# Patient Record
Sex: MALE | Race: WHITE | NOT HISPANIC OR LATINO | Employment: FULL TIME | URBAN - METROPOLITAN AREA
[De-identification: names, ages, dates, MRNs, and addresses within clinical notes are randomized per-mention and may not be internally consistent; named-entity substitution may affect disease eponyms.]

---

## 2018-07-05 DIAGNOSIS — I25.10 CORONARY ARTERY DISEASE, ANGINA PRESENCE UNSPECIFIED, UNSPECIFIED VESSEL OR LESION TYPE, UNSPECIFIED WHETHER NATIVE OR TRANSPLANTED HEART: Primary | ICD-10-CM

## 2018-07-06 ENCOUNTER — CLINICAL SUPPORT (OUTPATIENT)
Dept: CARDIOLOGY | Facility: CLINIC | Age: 63
End: 2018-07-06
Payer: COMMERCIAL

## 2018-07-06 ENCOUNTER — OFFICE VISIT (OUTPATIENT)
Dept: CARDIOLOGY | Facility: CLINIC | Age: 63
End: 2018-07-06
Payer: COMMERCIAL

## 2018-07-06 VITALS
WEIGHT: 208 LBS | BODY MASS INDEX: 26.69 KG/M2 | HEART RATE: 60 BPM | HEIGHT: 74 IN | DIASTOLIC BLOOD PRESSURE: 92 MMHG | SYSTOLIC BLOOD PRESSURE: 156 MMHG

## 2018-07-06 DIAGNOSIS — I10 ESSENTIAL HYPERTENSION: Primary | ICD-10-CM

## 2018-07-06 DIAGNOSIS — B20 HIV (HUMAN IMMUNODEFICIENCY VIRUS INFECTION): ICD-10-CM

## 2018-07-06 DIAGNOSIS — I25.10 CORONARY ARTERY DISEASE, ANGINA PRESENCE UNSPECIFIED, UNSPECIFIED VESSEL OR LESION TYPE, UNSPECIFIED WHETHER NATIVE OR TRANSPLANTED HEART: ICD-10-CM

## 2018-07-06 DIAGNOSIS — R07.9 CHEST PAIN SYNDROME: ICD-10-CM

## 2018-07-06 DIAGNOSIS — M79.606 PAIN OF LOWER EXTREMITY, UNSPECIFIED LATERALITY: ICD-10-CM

## 2018-07-06 PROCEDURE — 93000 ELECTROCARDIOGRAM COMPLETE: CPT | Mod: S$GLB,,, | Performed by: INTERNAL MEDICINE

## 2018-07-06 PROCEDURE — 3008F BODY MASS INDEX DOCD: CPT | Mod: CPTII,S$GLB,, | Performed by: INTERNAL MEDICINE

## 2018-07-06 PROCEDURE — 99204 OFFICE O/P NEW MOD 45 MIN: CPT | Mod: S$GLB,,, | Performed by: INTERNAL MEDICINE

## 2018-07-06 PROCEDURE — 99999 PR PBB SHADOW E&M-EST. PATIENT-LVL III: CPT | Mod: PBBFAC,,, | Performed by: INTERNAL MEDICINE

## 2018-07-06 RX ORDER — LOPERAMIDE HCL 2 MG
2 TABLET ORAL 2 TIMES DAILY
COMMUNITY

## 2018-07-06 RX ORDER — RITONAVIR 100 MG/1
100 TABLET ORAL DAILY
COMMUNITY
Start: 2015-12-30

## 2018-07-06 RX ORDER — LOSARTAN POTASSIUM 25 MG/1
25 TABLET ORAL DAILY
Qty: 30 TABLET | Refills: 3 | Status: SHIPPED | OUTPATIENT
Start: 2018-07-06 | End: 2018-08-24 | Stop reason: SDUPTHER

## 2018-07-06 RX ORDER — ASPIRIN 81 MG/1
81 TABLET ORAL
COMMUNITY

## 2018-07-06 RX ORDER — DOXYCYCLINE HYCLATE 20 MG
20 TABLET ORAL 2 TIMES DAILY
COMMUNITY
Start: 2015-12-24

## 2018-07-06 RX ORDER — DARUNAVIR 800 MG/1
800 TABLET, FILM COATED ORAL
COMMUNITY
Start: 2015-12-31

## 2018-07-06 NOTE — PROGRESS NOTES
Subjective:   Patient ID:  Edward Brown is a 63 y.o. male who presents for evaluation of Peripheral Vascular Disease and Chest Pain      HPI  A 64 yo male hiv positive is here for evaluation of htn. He plays [employer] twice weekly does grocery shopping work in the yard no chest pain or shortness of breath. He has sleep apnea on therapy has chest pain that occurs w/o any rhyme or reason no associated symptoms has no  shortness of breath unless he exerts himself  No morning cough no dizziness lightheadedness syncope near syncope no palpitation. He does not use extra salt in food. Has no regular exercise otherwise. Has leg pain at nite  From knees down. No claudicatin.  Past Medical History:   Diagnosis Date    Essential hypertension 7/6/2018    HIV (human immunodeficiency virus infection)     Human immunodeficiency virus (HIV)        History reviewed. No pertinent surgical history.    Social History   Substance Use Topics    Smoking status: Current Every Day Smoker     Years: 40.00    Smokeless tobacco: Never Used    Alcohol use 1.8 oz/week     3 Shots of liquor per week      Comment: 1.8oz x7        Family History   Problem Relation Age of Onset    Pacemaker/defibrilator Mother     Pacemaker/defibrilator Father     Stroke Neg Hx     Heart attack Neg Hx        Current Outpatient Prescriptions   Medication Sig    ascorbic acid, vitamin C, (VITAMIN C) 100 MG tablet Take 1,000 mg by mouth once daily.    aspirin (ECOTRIN) 81 MG EC tablet Take 81 mg by mouth.    darunavir ethanolate (PREZISTA) 800 mg Tab Take by mouth.    dolutegravir (TIVICAY) 50 mg Tab Take by mouth.    doxycycline (PERIOSTAT) 20 MG tablet Take 20 mg by mouth.    loperamide (IMODIUM A-D) 2 mg Tab Take 2 mg by mouth.    ritonavir (NORVIR) 100 mg Tab tablet Take by mouth.     No current facility-administered medications for this visit.      No current outpatient prescriptions on file prior to visit.     No current facility-administered  medications on file prior to visit.        Review of patient's allergies indicates:  No Known Allergies    Review of Systems   Constitution: Negative for weakness and malaise/fatigue.   Eyes: Negative for blurred vision.   Cardiovascular: Positive for chest pain. Negative for claudication, cyanosis, dyspnea on exertion, irregular heartbeat, leg swelling, near-syncope, orthopnea, palpitations and paroxysmal nocturnal dyspnea.   Respiratory: Negative for cough, hemoptysis and shortness of breath.    Hematologic/Lymphatic: Negative for bleeding problem. Does not bruise/bleed easily.   Skin: Negative for dry skin and itching.   Musculoskeletal: Negative for falls, muscle weakness and myalgias.        Leg pain   Gastrointestinal: Negative for abdominal pain, diarrhea, heartburn, hematemesis, hematochezia and melena.   Genitourinary: Negative for flank pain and hematuria.   Neurological: Negative for dizziness, focal weakness, headaches, light-headedness, numbness, paresthesias and seizures.   Psychiatric/Behavioral: Negative for altered mental status and memory loss. The patient is not nervous/anxious.    Allergic/Immunologic: Negative for hives.       Objective:   Physical Exam   Constitutional: He is oriented to person, place, and time. He appears well-developed and well-nourished. No distress.   HENT:   Head: Normocephalic and atraumatic.   Eyes: EOM are normal. Pupils are equal, round, and reactive to light. Right eye exhibits no discharge. Left eye exhibits no discharge.   Neck: Neck supple. No JVD present. No thyromegaly present.   Cardiovascular: Normal rate, regular rhythm, normal heart sounds and intact distal pulses.  Exam reveals no gallop and no friction rub.    No murmur heard.  Pulmonary/Chest: Effort normal and breath sounds normal. No respiratory distress. He has no wheezes. He has no rales. He exhibits no tenderness.   Abdominal: Soft. Bowel sounds are normal. He exhibits no distension. There is no  "tenderness.   Musculoskeletal: Normal range of motion. He exhibits no edema.   Neurological: He is alert and oriented to person, place, and time. No cranial nerve deficit.   Skin: Skin is warm and dry. No rash noted. He is not diaphoretic. No erythema.   Psychiatric: He has a normal mood and affect. His behavior is normal.   Nursing note and vitals reviewed.    Vitals:    07/06/18 1627   BP: (!) 156/92   Pulse: 60   Weight: 94.3 kg (208 lb)   Height: 6' 2" (1.88 m)     Reviewed labs from DR MATHUR  TG 67 CHOLEST 174   Assessment:     1. Essential hypertension    2. HIV (human immunodeficiency virus infection)    3. Chest pain syndrome    4. Pain of lower extremity, unspecified laterality      ATYPICAL CHEST WITH HTN AND RISK FACTOR FOR CAD   Plan:   LOSARTAN 25 MG PO DAILY   ETT   ECHO   MARIETTA   BP MACHINE AND READING FOR REVIEW IN 1 MONTH.  "

## 2018-07-06 NOTE — LETTER
July 6, 2018      Paulina Quintana MD  [address]  [place] [address]           [facility] - Cardiology  [address]  [place] [address]  Phone: [phone]  Fax: [phone]          Patient: Edward Brown   MR Number: [id number]   YOB: 1955   Date of Visit: 7/6/2018       Dear Dr. Paulina Quintana:    Thank you for referring Edward Brown to me for evaluation. Attached you will find relevant portions of my assessment and plan of care.    If you have questions, please do not hesitate to call me. I look forward to following Edward Brown along with you.    Sincerely,    Winnie Fontaine MD    Enclosure  CC:  No Recipients    If you would like to receive this communication electronically, please contact [email or web address] or [phone] to request more information on [employer] access.    For providers and/or their staff who would like to refer a patient to [facility], please contact us through our one-stop-shop provider referral line, [facility] , at [phone].    If you feel you have received this communication in error or would no longer like to receive these types of communications, please e-mail [email or web address]

## 2018-08-14 ENCOUNTER — CLINICAL SUPPORT (OUTPATIENT)
Dept: CARDIOLOGY | Facility: CLINIC | Age: 63
End: 2018-08-14
Attending: INTERNAL MEDICINE
Payer: COMMERCIAL

## 2018-08-14 DIAGNOSIS — I10 ESSENTIAL HYPERTENSION: ICD-10-CM

## 2018-08-14 DIAGNOSIS — R07.9 CHEST PAIN SYNDROME: ICD-10-CM

## 2018-08-14 DIAGNOSIS — M79.606 PAIN OF LOWER EXTREMITY, UNSPECIFIED LATERALITY: ICD-10-CM

## 2018-08-14 LAB
DIASTOLIC DYSFUNCTION: NO
DIASTOLIC DYSFUNCTION: NO
ESTIMATED PA SYSTOLIC PRESSURE: 29.01
RETIRED EF AND QEF - SEE NOTES: 60 (ref 55–65)
VASCULAR ANKLE BRACHIAL INDEX (ABI) RIGHT: 1.08 (ref 0.9–1.2)

## 2018-08-14 PROCEDURE — 93922 UPR/L XTREMITY ART 2 LEVELS: CPT | Mod: S$GLB,,, | Performed by: INTERNAL MEDICINE

## 2018-08-14 PROCEDURE — 93015 CV STRESS TEST SUPVJ I&R: CPT | Mod: S$GLB,,, | Performed by: INTERNAL MEDICINE

## 2018-08-14 PROCEDURE — 93306 TTE W/DOPPLER COMPLETE: CPT | Mod: S$GLB,,, | Performed by: INTERNAL MEDICINE

## 2018-08-15 ENCOUNTER — TELEPHONE (OUTPATIENT)
Dept: CARDIOLOGY | Facility: CLINIC | Age: 63
End: 2018-08-15

## 2018-08-16 ENCOUNTER — TELEPHONE (OUTPATIENT)
Dept: CARDIOLOGY | Facility: CLINIC | Age: 63
End: 2018-08-16

## 2018-08-16 DIAGNOSIS — R07.9 CHEST PAIN SYNDROME: ICD-10-CM

## 2018-08-16 DIAGNOSIS — I10 ESSENTIAL HYPERTENSION: Primary | ICD-10-CM

## 2018-08-16 DIAGNOSIS — B20 HIV (HUMAN IMMUNODEFICIENCY VIRUS INFECTION): ICD-10-CM

## 2018-08-16 DIAGNOSIS — R07.9 ACUTE CHEST PAIN: ICD-10-CM

## 2018-08-16 DIAGNOSIS — I11.9 HYPERTENSIVE HEART DISEASE, UNSPECIFIED WHETHER HEART FAILURE PRESENT: ICD-10-CM

## 2018-08-16 NOTE — TELEPHONE ENCOUNTER
----- Message from Winnie Fontaine MD sent at 8/14/2018  6:47 PM CDT -----  His stress test is non diagnostic needs reepat with cardiolite

## 2018-08-17 ENCOUNTER — TELEPHONE (OUTPATIENT)
Dept: CARDIOLOGY | Facility: CLINIC | Age: 63
End: 2018-08-17

## 2018-08-17 NOTE — TELEPHONE ENCOUNTER
Rescheduled NMT and Dr. Fontaine's follow up    ----- Message from Everette Holder sent at 8/17/2018 10:36 AM CDT -----  Contact: pt  Please give pt a call at ..[phone] (home) regarding his upcoming appt and a test he needs done.

## 2018-08-23 ENCOUNTER — CLINICAL SUPPORT (OUTPATIENT)
Dept: CARDIOLOGY | Facility: CLINIC | Age: 63
End: 2018-08-23
Attending: INTERNAL MEDICINE
Payer: COMMERCIAL

## 2018-08-23 ENCOUNTER — HOSPITAL ENCOUNTER (OUTPATIENT)
Dept: RADIOLOGY | Facility: HOSPITAL | Age: 63
Discharge: HOME | End: 2018-08-23
Attending: INTERNAL MEDICINE
Payer: COMMERCIAL

## 2018-08-23 DIAGNOSIS — R07.9 ACUTE CHEST PAIN: ICD-10-CM

## 2018-08-23 DIAGNOSIS — I11.9 HYPERTENSIVE HEART DISEASE, UNSPECIFIED WHETHER HEART FAILURE PRESENT: ICD-10-CM

## 2018-08-23 DIAGNOSIS — B20 HIV (HUMAN IMMUNODEFICIENCY VIRUS INFECTION): ICD-10-CM

## 2018-08-23 DIAGNOSIS — I10 ESSENTIAL HYPERTENSION: ICD-10-CM

## 2018-08-23 DIAGNOSIS — R07.9 CHEST PAIN SYNDROME: ICD-10-CM

## 2018-08-23 LAB — DIASTOLIC DYSFUNCTION: NO

## 2018-08-23 PROCEDURE — 78452 HT MUSCLE IMAGE SPECT MULT: CPT | Mod: 26,,, | Performed by: INTERNAL MEDICINE

## 2018-08-23 PROCEDURE — A9502 TC99M TETROFOSMIN: HCPCS | Mod: PO

## 2018-08-23 PROCEDURE — 93015 CV STRESS TEST SUPVJ I&R: CPT | Mod: S$GLB,,, | Performed by: INTERNAL MEDICINE

## 2018-08-24 ENCOUNTER — OFFICE VISIT (OUTPATIENT)
Dept: CARDIOLOGY | Facility: CLINIC | Age: 63
End: 2018-08-24
Payer: COMMERCIAL

## 2018-08-24 VITALS
WEIGHT: 213.88 LBS | SYSTOLIC BLOOD PRESSURE: 140 MMHG | BODY MASS INDEX: 27.45 KG/M2 | HEART RATE: 80 BPM | DIASTOLIC BLOOD PRESSURE: 80 MMHG | HEIGHT: 74 IN

## 2018-08-24 DIAGNOSIS — M79.606 PAIN OF LOWER EXTREMITY, UNSPECIFIED LATERALITY: ICD-10-CM

## 2018-08-24 DIAGNOSIS — B20 HIV (HUMAN IMMUNODEFICIENCY VIRUS INFECTION): Primary | ICD-10-CM

## 2018-08-24 DIAGNOSIS — I10 ESSENTIAL HYPERTENSION: ICD-10-CM

## 2018-08-24 DIAGNOSIS — R07.9 CHEST PAIN SYNDROME: ICD-10-CM

## 2018-08-24 PROCEDURE — 3008F BODY MASS INDEX DOCD: CPT | Mod: CPTII,S$GLB,, | Performed by: INTERNAL MEDICINE

## 2018-08-24 PROCEDURE — 99212 OFFICE O/P EST SF 10 MIN: CPT | Mod: S$GLB,,, | Performed by: INTERNAL MEDICINE

## 2018-08-24 PROCEDURE — 99999 PR PBB SHADOW E&M-EST. PATIENT-LVL III: CPT | Mod: PBBFAC,,, | Performed by: INTERNAL MEDICINE

## 2018-08-24 RX ORDER — DARUNAVIR 800 MG/1
1 TABLET, FILM COATED ORAL
COMMUNITY
Start: 2015-12-31 | End: 2018-08-24 | Stop reason: SDUPTHER

## 2018-08-24 RX ORDER — LOSARTAN POTASSIUM 50 MG/1
50 TABLET ORAL DAILY
Qty: 30 TABLET | Refills: 3 | Status: SHIPPED | OUTPATIENT
Start: 2018-08-24 | End: 2018-09-27 | Stop reason: SDUPTHER

## 2018-08-24 RX ORDER — RITONAVIR 100 MG/1
1 TABLET ORAL
COMMUNITY
Start: 2015-12-30 | End: 2018-08-24 | Stop reason: SDUPTHER

## 2018-08-24 NOTE — PROGRESS NOTES
"A 64 yo male here for f/u test results. He has tolerated losartan 25 mg po daily has some issues with compliance. His bp ready are labile his machine is off. He has not taken his losartan yesterday. His bp is still elevated. His lvf is normal by echo. His cardiolite is negative.his yossi is within normal.  He needs to stop smoking be compliant with salt and increase med 50 mg po daily .he drinks at least 3 scotches nightly.  counseled about low salt diet alcohol control   Will increase losartan 50 mg po daily .  He will follow with DR MATHUR.  AND WILL FOLLOW IN CARDIOLOGY WITH MID LEVEL IN 1 MONTH.  BP (!) 140/80 (BP Location: Left arm, Patient Position: Sitting, BP Method: Large (Manual))   Pulse 80   Ht 6' 2" (1.88 m)   Wt 97 kg (213 lb 13.5 oz)   BMI 27.46 kg/m²     "

## 2018-09-27 ENCOUNTER — OFFICE VISIT (OUTPATIENT)
Dept: CARDIOLOGY | Facility: CLINIC | Age: 63
End: 2018-09-27
Payer: COMMERCIAL

## 2018-09-27 VITALS
SYSTOLIC BLOOD PRESSURE: 142 MMHG | BODY MASS INDEX: 26.37 KG/M2 | HEART RATE: 74 BPM | HEIGHT: 74 IN | DIASTOLIC BLOOD PRESSURE: 88 MMHG | WEIGHT: 205.5 LBS

## 2018-09-27 DIAGNOSIS — I10 ESSENTIAL HYPERTENSION: Primary | ICD-10-CM

## 2018-09-27 DIAGNOSIS — G47.33 OSA ON CPAP: ICD-10-CM

## 2018-09-27 DIAGNOSIS — B20 HIV (HUMAN IMMUNODEFICIENCY VIRUS INFECTION): ICD-10-CM

## 2018-09-27 PROBLEM — R07.9 CHEST PAIN SYNDROME: Status: RESOLVED | Noted: 2018-07-06 | Resolved: 2018-09-27

## 2018-09-27 PROCEDURE — 99214 OFFICE O/P EST MOD 30 MIN: CPT | Mod: S$GLB,,, | Performed by: NURSE PRACTITIONER

## 2018-09-27 PROCEDURE — 99999 PR PBB SHADOW E&M-EST. PATIENT-LVL III: CPT | Mod: PBBFAC,,, | Performed by: NURSE PRACTITIONER

## 2018-09-27 PROCEDURE — 3008F BODY MASS INDEX DOCD: CPT | Mod: CPTII,S$GLB,, | Performed by: NURSE PRACTITIONER

## 2018-09-27 RX ORDER — LOSARTAN POTASSIUM 100 MG/1
100 TABLET ORAL DAILY
Qty: 30 TABLET | Refills: 6 | Status: SHIPPED | OUTPATIENT
Start: 2018-09-27 | End: 2019-04-01 | Stop reason: SDUPTHER

## 2018-09-27 NOTE — PROGRESS NOTES
Subjective:   Patient ID:  Edward Brown is a 63 y.o. male who presents for follow up of Hypertension      HPI    Patient presents to clinic for BP check after increasing Losartan to 50mg daily for HTN control. Has been doing much better with med compliance since last visit. Has no chest pain, MENDIETA, SOB, orthopnea, PND, dizziness, near syncope or syncope. Has no CNS Complaints to suggest TIA or CVA. Follows with Dr Quintana. Smokes 1.5 ppd. Still drinking 4 shots of ETOH weekly. Home BP has been ranging 140's/80's. Doing ok with limiting sodium intake. Had normal LVF on echo and negative stress test in August 2018.  Plays golf on the weekend.       Past Medical History:   Diagnosis Date    Essential hypertension 7/6/2018    HIV (human immunodeficiency virus infection)     Human immunodeficiency virus (HIV)     GINNA on CPAP 9/27/2018       History reviewed. No pertinent surgical history.    Social History     Tobacco Use    Smoking status: Current Every Day Smoker     Years: 40.00    Smokeless tobacco: Never Used   Substance Use Topics    Alcohol use: Yes     Alcohol/week: 1.8 oz     Types: 3 Shots of liquor per week     Comment: 1.8oz x7     Drug use: No       Family History   Problem Relation Age of Onset    Pacemaker/defibrilator Mother     Pacemaker/defibrilator Father     Stroke Neg Hx     Heart attack Neg Hx        Current Outpatient Medications   Medication Sig    ascorbic acid, vitamin C, (VITAMIN C) 100 MG tablet Take 500 mg by mouth 2 (two) times daily.     aspirin (ECOTRIN) 81 MG EC tablet Take 81 mg by mouth.    darunavir ethanolate (PREZISTA) 800 mg Tab Take 800 mg by mouth daily with breakfast.     dolutegravir (TIVICAY) 50 mg Tab Take 50 mg by mouth 2 (two) times daily.     doxycycline (PERIOSTAT) 20 MG tablet Take 20 mg by mouth 2 (two) times daily.     loperamide (IMODIUM A-D) 2 mg Tab Take 2 mg by mouth 2 (two) times daily.     losartan (COZAAR) 50 MG tablet Take 1 tablet (50 mg total)  "by mouth once daily.    ritonavir (NORVIR) 100 mg Tab tablet Take 100 mg by mouth once daily.      No current facility-administered medications for this visit.      Current Outpatient Medications on File Prior to Visit   Medication Sig    ascorbic acid, vitamin C, (VITAMIN C) 100 MG tablet Take 500 mg by mouth 2 (two) times daily.     aspirin (ECOTRIN) 81 MG EC tablet Take 81 mg by mouth.    darunavir ethanolate (PREZISTA) 800 mg Tab Take 800 mg by mouth daily with breakfast.     dolutegravir (TIVICAY) 50 mg Tab Take 50 mg by mouth 2 (two) times daily.     doxycycline (PERIOSTAT) 20 MG tablet Take 20 mg by mouth 2 (two) times daily.     loperamide (IMODIUM A-D) 2 mg Tab Take 2 mg by mouth 2 (two) times daily.     losartan (COZAAR) 50 MG tablet Take 1 tablet (50 mg total) by mouth once daily.    ritonavir (NORVIR) 100 mg Tab tablet Take 100 mg by mouth once daily.      No current facility-administered medications on file prior to visit.        ROS    Objective:   Physical Exam  Vitals:    09/27/18 1514   BP: (!) 142/88   Pulse: 74   Weight: 93.2 kg (205 lb 7.5 oz)   Height: 6' 2" (1.88 m)     No results found for: CHOL  No results found for: HDL  No results found for: LDLCALC  No results found for: TRIG  No results found for: CHOLHDL    Chemistry    No results found for: NA, K, CL, CO2, BUN, CREATININE, GLU No results found for: CALCIUM, ALKPHOS, AST, ALT, BILITOT, ESTGFRAFRICA, EGFRNONAA       No results found for: TSH  No results found for: INR, PROTIME  No results found for: WBC, HGB, HCT, MCV, PLT  BMP  No results found for: NA, K, CL, CO2, BUN, CREATININE, CALCIUM, ANIONGAP, ESTGFRAFRICA, EGFRNONAA  CrCl cannot be calculated (No order found.).    Assessment:     1. Essential hypertension    2. HIV (human immunodeficiency virus infection)    3. GINNA on CPAP    BP still not at goal   Still smoking   Has cut back on drinking ETOH  Doing ok with limiting his sodium intake    Plan:   Increase Losartan to " 100mg daily   Keep BP log and RTC in 1 month for BP check with nurse  Smoking cessation   Counseled on ETOH use

## 2018-11-15 ENCOUNTER — TELEPHONE (OUTPATIENT)
Dept: CARDIOLOGY | Facility: CLINIC | Age: 63
End: 2018-11-15

## 2018-11-15 ENCOUNTER — CLINICAL SUPPORT (OUTPATIENT)
Dept: CARDIOLOGY | Facility: CLINIC | Age: 63
End: 2018-11-15
Payer: COMMERCIAL

## 2018-11-15 VITALS — DIASTOLIC BLOOD PRESSURE: 78 MMHG | SYSTOLIC BLOOD PRESSURE: 134 MMHG

## 2018-11-15 PROCEDURE — 99999 PR PBB SHADOW E&M-EST. PATIENT-LVL I: CPT | Mod: PBBFAC,,,

## 2018-11-15 NOTE — PROGRESS NOTES
Pt came in for a nurse visit for a BP check.  Pt takes Losartan to 100mg daily.  Pt states he is feeling better.  Pt BP is:  R:  136/82  And L:  134/78

## 2018-11-15 NOTE — TELEPHONE ENCOUNTER
Pt was seen for a BP check today.  Pt states he is taking the losartan 100mg.      BP was  R: 136/82   And  L:  134/78    Pt stated he was feeling fine.  Pt also stated that he will resume taking his BP at home.

## 2019-01-23 ENCOUNTER — OFFICE VISIT (OUTPATIENT)
Dept: CARDIOLOGY | Facility: CLINIC | Age: 64
End: 2019-01-23
Payer: COMMERCIAL

## 2019-01-23 VITALS
HEIGHT: 74 IN | BODY MASS INDEX: 27.05 KG/M2 | SYSTOLIC BLOOD PRESSURE: 138 MMHG | WEIGHT: 210.75 LBS | DIASTOLIC BLOOD PRESSURE: 80 MMHG | HEART RATE: 69 BPM

## 2019-01-23 DIAGNOSIS — B20 HIV (HUMAN IMMUNODEFICIENCY VIRUS INFECTION): ICD-10-CM

## 2019-01-23 DIAGNOSIS — G47.33 OSA ON CPAP: ICD-10-CM

## 2019-01-23 DIAGNOSIS — I10 ESSENTIAL HYPERTENSION: Primary | ICD-10-CM

## 2019-01-23 PROCEDURE — 3079F DIAST BP 80-89 MM HG: CPT | Mod: CPTII,S$GLB,, | Performed by: INTERNAL MEDICINE

## 2019-01-23 PROCEDURE — 99212 PR OFFICE/OUTPT VISIT, EST, LEVL II, 10-19 MIN: ICD-10-PCS | Mod: S$GLB,,, | Performed by: INTERNAL MEDICINE

## 2019-01-23 PROCEDURE — 3008F PR BODY MASS INDEX (BMI) DOCUMENTED: ICD-10-PCS | Mod: CPTII,S$GLB,, | Performed by: INTERNAL MEDICINE

## 2019-01-23 PROCEDURE — 3008F BODY MASS INDEX DOCD: CPT | Mod: CPTII,S$GLB,, | Performed by: INTERNAL MEDICINE

## 2019-01-23 PROCEDURE — 99212 OFFICE O/P EST SF 10 MIN: CPT | Mod: S$GLB,,, | Performed by: INTERNAL MEDICINE

## 2019-01-23 PROCEDURE — 3075F PR MOST RECENT SYSTOLIC BLOOD PRESS GE 130-139MM HG: ICD-10-PCS | Mod: CPTII,S$GLB,, | Performed by: INTERNAL MEDICINE

## 2019-01-23 PROCEDURE — 3079F PR MOST RECENT DIASTOLIC BLOOD PRESSURE 80-89 MM HG: ICD-10-PCS | Mod: CPTII,S$GLB,, | Performed by: INTERNAL MEDICINE

## 2019-01-23 PROCEDURE — 3075F SYST BP GE 130 - 139MM HG: CPT | Mod: CPTII,S$GLB,, | Performed by: INTERNAL MEDICINE

## 2019-01-23 PROCEDURE — 99999 PR PBB SHADOW E&M-EST. PATIENT-LVL III: ICD-10-PCS | Mod: PBBFAC,,, | Performed by: INTERNAL MEDICINE

## 2019-01-23 PROCEDURE — 99999 PR PBB SHADOW E&M-EST. PATIENT-LVL III: CPT | Mod: PBBFAC,,, | Performed by: INTERNAL MEDICINE

## 2019-01-23 NOTE — PROGRESS NOTES
Subjective:   Patient ID:  Edward Brown is a 64 y.o. male who presents for follow up of Follow-up      HPI  A 63 yo male with hiv htn is here for f/u he is still smoking he is drinking couple of cocktails no chest pain or shortness of breath his bp is borderline he tries to be compliant with salt . He has been doing well clinically no chf no claudication.  Past Medical History:   Diagnosis Date    Essential hypertension 7/6/2018    HIV (human immunodeficiency virus infection)     Human immunodeficiency virus (HIV)     GINNA on CPAP 9/27/2018       History reviewed. No pertinent surgical history.    Social History     Tobacco Use    Smoking status: Current Every Day Smoker     Years: 40.00    Smokeless tobacco: Never Used   Substance Use Topics    Alcohol use: Yes     Alcohol/week: 1.8 oz     Types: 3 Shots of liquor per week     Comment: 1.8oz x7     Drug use: No       Family History   Problem Relation Age of Onset    Pacemaker/defibrilator Mother     Pacemaker/defibrilator Father     Stroke Neg Hx     Heart attack Neg Hx        Current Outpatient Medications   Medication Sig    AFLURIA QUAD [id number], PF, 60 mcg (15 mcg x 4)/0.5 mL Syrg vaccine     ascorbic acid, vitamin C, (VITAMIN C) 100 MG tablet Take 500 mg by mouth 2 (two) times daily.     aspirin (ECOTRIN) 81 MG EC tablet Take 81 mg by mouth.    darunavir ethanolate (PREZISTA) 800 mg Tab Take 800 mg by mouth daily with breakfast.     dolutegravir (TIVICAY) 50 mg Tab Take 50 mg by mouth 2 (two) times daily.     doxycycline (PERIOSTAT) 20 MG tablet Take 20 mg by mouth 2 (two) times daily.     loperamide (IMODIUM A-D) 2 mg Tab Take 2 mg by mouth 2 (two) times daily.     losartan (COZAAR) 100 MG tablet Take 1 tablet (100 mg total) by mouth once daily.    ritonavir (NORVIR) 100 mg Tab tablet Take 100 mg by mouth once daily.      No current facility-administered medications for this visit.      Current Outpatient Medications on File Prior to  Visit   Medication Sig    AFLURIA QUAD [id number], PF, 60 mcg (15 mcg x 4)/0.5 mL Syrg vaccine     ascorbic acid, vitamin C, (VITAMIN C) 100 MG tablet Take 500 mg by mouth 2 (two) times daily.     aspirin (ECOTRIN) 81 MG EC tablet Take 81 mg by mouth.    darunavir ethanolate (PREZISTA) 800 mg Tab Take 800 mg by mouth daily with breakfast.     dolutegravir (TIVICAY) 50 mg Tab Take 50 mg by mouth 2 (two) times daily.     doxycycline (PERIOSTAT) 20 MG tablet Take 20 mg by mouth 2 (two) times daily.     loperamide (IMODIUM A-D) 2 mg Tab Take 2 mg by mouth 2 (two) times daily.     losartan (COZAAR) 100 MG tablet Take 1 tablet (100 mg total) by mouth once daily.    ritonavir (NORVIR) 100 mg Tab tablet Take 100 mg by mouth once daily.      No current facility-administered medications on file prior to visit.      Review of patient's allergies indicates:  No Known Allergies  Review of Systems   Constitution: Negative for weakness and malaise/fatigue.   Eyes: Negative for blurred vision.   Cardiovascular: Negative for chest pain, claudication, cyanosis, dyspnea on exertion, irregular heartbeat, leg swelling, near-syncope, orthopnea, palpitations and paroxysmal nocturnal dyspnea.   Respiratory: Negative for cough, hemoptysis and shortness of breath.    Hematologic/Lymphatic: Negative for bleeding problem. Does not bruise/bleed easily.   Skin: Negative for dry skin and itching.   Musculoskeletal: Negative for falls, muscle weakness and myalgias.   Gastrointestinal: Negative for abdominal pain, diarrhea, heartburn, hematemesis, hematochezia and melena.   Genitourinary: Negative for flank pain and hematuria.   Neurological: Negative for dizziness, focal weakness, headaches, light-headedness, numbness, paresthesias and seizures.   Psychiatric/Behavioral: Negative for altered mental status and memory loss. The patient is not nervous/anxious.    Allergic/Immunologic: Negative for hives.       Objective:   Physical Exam  "  Constitutional: He is oriented to person, place, and time. He appears well-developed and well-nourished. No distress.   HENT:   Head: Normocephalic and atraumatic.   Eyes: EOM are normal. Pupils are equal, round, and reactive to light. Right eye exhibits no discharge. Left eye exhibits no discharge.   Neck: Neck supple. No JVD present. No thyromegaly present.   Cardiovascular: Normal rate, regular rhythm, normal heart sounds and intact distal pulses. Exam reveals no gallop and no friction rub.   No murmur heard.  Pulmonary/Chest: Effort normal and breath sounds normal. No respiratory distress. He has no wheezes. He has no rales. He exhibits no tenderness.   Abdominal: Soft. Bowel sounds are normal. He exhibits no distension. There is no tenderness.   Musculoskeletal: Normal range of motion. He exhibits no edema.   Neurological: He is alert and oriented to person, place, and time. No cranial nerve deficit.   Skin: Skin is warm and dry. No rash noted. He is not diaphoretic. No erythema.   Psychiatric: He has a normal mood and affect. His behavior is normal.   Nursing note and vitals reviewed.    Vitals:    01/23/19 1612   BP: 138/80   BP Location: Right arm   Patient Position: Sitting   BP Method: Medium (Automatic)   Pulse: 69   Weight: 95.6 kg (210 lb 12.2 oz)   Height: 6' 2" (1.88 m)     No results found for: CHOL  No results found for: HDL  No results found for: LDLCALC  No results found for: TRIG  No results found for: CHOLHDL    Chemistry    No results found for: NA, K, CL, CO2, BUN, CREATININE, GLU No results found for: CALCIUM, ALKPHOS, AST, ALT, BILITOT, ESTGFRAFRICA, EGFRNONAA       No results found for: TSH  No results found for: INR, PROTIME  No results found for: WBC, HGB, HCT, MCV, PLT  BMP  No results found for: NA, K, CL, CO2, BUN, CREATININE, CALCIUM, ANIONGAP, ESTGFRAFRICA, EGFRNONAA  CrCl cannot be calculated (No order found.).    Assessment:     1. Essential hypertension    2. HIV (human " immunodeficiency virus infection)    3. GINNA on CPAP      He needs to be strict with bp control he was counseled also counseled about weight loss diet exercise.   He will bring his machine in to check it then will profile bp.  Plan:   bp machine check  Send in a bp ledger then make decision accordingly.

## 2019-01-25 ENCOUNTER — CLINICAL SUPPORT (OUTPATIENT)
Dept: CARDIOLOGY | Facility: CLINIC | Age: 64
End: 2019-01-25
Payer: COMMERCIAL

## 2019-01-25 VITALS — SYSTOLIC BLOOD PRESSURE: 126 MMHG | HEART RATE: 76 BPM | DIASTOLIC BLOOD PRESSURE: 80 MMHG

## 2019-01-25 PROCEDURE — 99999 PR PBB SHADOW E&M-EST. PATIENT-LVL II: ICD-10-PCS | Mod: PBBFAC,,,

## 2019-01-25 PROCEDURE — 99999 PR PBB SHADOW E&M-EST. PATIENT-LVL II: CPT | Mod: PBBFAC,,,

## 2019-01-25 NOTE — PROGRESS NOTES
Patient came into office today for blood pressure check with home monitor.    Home Monitor    Right arm:131/78    Left arm: 148/94 pulse 84    Manually:     Right arm: 126/80    Left arm: 120/80 pulse 76      Patient checked blood pressure with same machine but different cuff.     Left arm: 155/98    Right arm: 133/78     Patient was advised to use cuff first used during visit. Patient verbalizes understanding. Patient will continue current medications as prescribed unless Dr. Fontaine sees need for a change.

## 2019-02-01 ENCOUNTER — TELEPHONE (OUTPATIENT)
Dept: CARDIOLOGY | Facility: CLINIC | Age: 64
End: 2019-02-01

## 2019-02-01 NOTE — TELEPHONE ENCOUNTER
Spoke with patient--advised to check batteries in machine and profile blood pressure for 2-4 weeks and send via [facility]--patient states takes blood pressure medications at about 10:30 P.M. Nightly and will monitor blood pressure in the morning when he awakes which is about 7 A.M.

## 2019-02-01 NOTE — TELEPHONE ENCOUNTER
----- Message from Winnie Fontaine MD sent at 1/27/2019 11:02 AM CST -----  His bp by our check is controlled his machine is off . May need change batteries. And reprofile bp for 2-4 weeks for our review  ----- Message -----  From: Nitesh Bonner LPN  Sent: 1/25/2019   5:07 PM  To: Winnie Fontaine MD

## 2019-04-01 DIAGNOSIS — I10 ESSENTIAL HYPERTENSION: ICD-10-CM

## 2019-04-01 RX ORDER — LOSARTAN POTASSIUM 100 MG/1
100 TABLET ORAL DAILY
Qty: 30 TABLET | Refills: 11 | Status: SHIPPED | OUTPATIENT
Start: 2019-04-01

## 2019-04-01 NOTE — TELEPHONE ENCOUNTER
----- Message from Anay Tovar sent at 4/1/2019 11:33 AM CDT -----  Contact: [facility]  Type:  Pharmacy Calling to Clarify an RX    Name of Caller: Alejandra  Pharmacy Name: [facility]  Prescription Name:Mika  What do they need to clarify?: They need a medication approval for the pt's blood pressure medication.   Best Call Back Number:[phone]  Additional Information: n/a

## 2022-10-10 ENCOUNTER — PATIENT MESSAGE (OUTPATIENT)
Dept: RESEARCH | Facility: HOSPITAL | Age: 67
End: 2022-10-10
Payer: COMMERCIAL